# Patient Record
Sex: MALE | Race: WHITE | NOT HISPANIC OR LATINO | Employment: OTHER | URBAN - METROPOLITAN AREA
[De-identification: names, ages, dates, MRNs, and addresses within clinical notes are randomized per-mention and may not be internally consistent; named-entity substitution may affect disease eponyms.]

---

## 2020-04-02 DIAGNOSIS — J90 PLEURAL EFFUSION, NOT ELSEWHERE CLASSIFIED: Primary | ICD-10-CM

## 2020-04-20 ENCOUNTER — HOSPITAL ENCOUNTER (OUTPATIENT)
Dept: RADIOLOGY | Facility: HOSPITAL | Age: 55
Discharge: HOME | End: 2020-04-20
Attending: SURGERY
Payer: COMMERCIAL

## 2020-04-20 VITALS — BODY MASS INDEX: 27.17 KG/M2 | HEIGHT: 73 IN | WEIGHT: 205 LBS

## 2020-04-20 DIAGNOSIS — J90 PLEURAL EFFUSION, NOT ELSEWHERE CLASSIFIED: ICD-10-CM

## 2020-04-20 LAB — GLUCOSE SERPL-MCNC: 96 MG/DL (ref 70–110)

## 2020-04-20 PROCEDURE — A9552 F18 FDG: HCPCS | Mod: PO

## 2020-04-20 PROCEDURE — 78815 PET IMAGE W/CT SKULL-THIGH: CPT | Mod: TC,PO

## 2020-08-25 ENCOUNTER — TELEPHONE (OUTPATIENT)
Dept: ORTHOPEDICS | Facility: CLINIC | Age: 55
End: 2020-08-25

## 2020-08-25 NOTE — TELEPHONE ENCOUNTER
We spoke  appt booked with Dr Jaiden Yang for after pt gets his studies in mid September I also left a message with the [facility] Jaiden Price LPN that the patient was not seeing Dr Conroy  he is seeing an orthopod oncologist=Dr Jaiden Yang

## 2020-09-11 ENCOUNTER — HOSPITAL ENCOUNTER (OUTPATIENT)
Dept: RADIOLOGY | Facility: HOSPITAL | Age: 55
Discharge: HOME | End: 2020-09-11
Attending: ORTHOPAEDIC SURGERY
Payer: COMMERCIAL

## 2020-09-11 ENCOUNTER — OFFICE VISIT (OUTPATIENT)
Dept: ORTHOPEDICS | Facility: CLINIC | Age: 55
End: 2020-09-11
Payer: COMMERCIAL

## 2020-09-11 VITALS — BODY MASS INDEX: 26.51 KG/M2 | WEIGHT: 200 LBS | HEIGHT: 73 IN

## 2020-09-11 DIAGNOSIS — M25.551 PAIN OF RIGHT HIP JOINT: ICD-10-CM

## 2020-09-11 DIAGNOSIS — C79.51 METASTATIC CARCINOMA TO BONE: ICD-10-CM

## 2020-09-11 DIAGNOSIS — S72.121A: Primary | ICD-10-CM

## 2020-09-11 DIAGNOSIS — C09.9 PRIMARY TONSILLAR SQUAMOUS CELL CARCINOMA: ICD-10-CM

## 2020-09-11 DIAGNOSIS — M25.551 PAIN OF RIGHT HIP JOINT: Primary | ICD-10-CM

## 2020-09-11 PROCEDURE — 99213 OFFICE O/P EST LOW 20 MIN: CPT | Mod: PBBFAC,25 | Performed by: ORTHOPAEDIC SURGERY

## 2020-09-11 PROCEDURE — 99203 OFFICE O/P NEW LOW 30 MIN: CPT | Mod: S$PBB,,, | Performed by: ORTHOPAEDIC SURGERY

## 2020-09-11 PROCEDURE — 73502 X-RAY EXAM HIP UNI 2-3 VIEWS: CPT | Mod: TC,RT

## 2020-09-11 PROCEDURE — 73502 X-RAY EXAM HIP UNI 2-3 VIEWS: CPT | Mod: 26,RT,, | Performed by: RADIOLOGY

## 2020-09-11 PROCEDURE — 99999 PR PBB SHADOW E&M-EST. PATIENT-LVL III: CPT | Mod: PBBFAC,,, | Performed by: ORTHOPAEDIC SURGERY

## 2020-09-11 PROCEDURE — 99203 PR OFFICE/OUTPT VISIT, NEW, LEVL III, 30-44 MIN: ICD-10-PCS | Mod: S$PBB,,, | Performed by: ORTHOPAEDIC SURGERY

## 2020-09-11 PROCEDURE — 73502 XR HIP 2 VIEW RIGHT: ICD-10-PCS | Mod: 26,RT,, | Performed by: RADIOLOGY

## 2020-09-11 PROCEDURE — 99999 PR PBB SHADOW E&M-EST. PATIENT-LVL III: ICD-10-PCS | Mod: PBBFAC,,, | Performed by: ORTHOPAEDIC SURGERY

## 2020-09-11 RX ORDER — LORATADINE 10 MG/1
10 TABLET ORAL DAILY
COMMUNITY

## 2020-09-11 RX ORDER — OMEPRAZOLE 20 MG/1
20 CAPSULE, DELAYED RELEASE ORAL DAILY
COMMUNITY

## 2020-09-11 RX ORDER — CAPSAICIN 0.03 G/100G
CREAM TOPICAL 2 TIMES DAILY
COMMUNITY

## 2020-09-11 RX ORDER — IBUPROFEN 800 MG/1
800 TABLET ORAL 3 TIMES DAILY
COMMUNITY

## 2020-09-11 RX ORDER — ATORVASTATIN CALCIUM 80 MG/1
80 TABLET, FILM COATED ORAL DAILY
COMMUNITY

## 2020-09-11 RX ORDER — METHOCARBAMOL 750 MG/1
500 TABLET, FILM COATED ORAL 4 TIMES DAILY
COMMUNITY

## 2020-09-11 RX ORDER — AMITRIPTYLINE HYDROCHLORIDE 10 MG/1
10 TABLET, FILM COATED ORAL NIGHTLY PRN
COMMUNITY

## 2020-09-11 RX ORDER — FERROUS SULFATE 325(65) MG
325 TABLET ORAL
COMMUNITY

## 2020-09-11 RX ORDER — ERGOCALCIFEROL 1.25 MG/1
50000 CAPSULE ORAL
COMMUNITY

## 2020-09-11 RX ORDER — LEVOTHYROXINE SODIUM 150 UG/1
150 TABLET ORAL
COMMUNITY

## 2020-09-11 NOTE — LETTER
September 11, 2020      [facility] 5[address]  [address], [address]  [facility] [address]  Phone: [phone]       Patient: Daryl Lazo   YOB: 1965  Date of Visit: 09/11/2020    To Whom It May Concern:    Ave Lazo  was at [facility] on 09/11/2020. He may return to work on 9/14/2020 with no restrictions. He may drive.  If you have any questions or concerns, or if I can be of further assistance, please do not hesitate to contact me.    Sincerely,    Jaiden Yang MD

## 2020-09-12 PROBLEM — S72.121A: Status: ACTIVE | Noted: 2020-09-12

## 2020-09-12 PROBLEM — C09.9 PRIMARY TONSILLAR SQUAMOUS CELL CARCINOMA: Status: ACTIVE | Noted: 2020-09-12

## 2020-09-12 PROBLEM — C79.51 METASTATIC CARCINOMA TO BONE: Status: ACTIVE | Noted: 2020-09-12

## 2020-09-12 NOTE — PROGRESS NOTES
Subjective:       Patient ID: Daryl Lazo is a 55 y.o. male.    Chief Complaint:   Pain of the Right Hip   He is sent by the [facility] in [place] for pain in the right hip.  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  His only current treatment is Keytruda.  He previously had intensive chemotherapy.  He has not yet required any surgery for this bony involvement.  He was not having any pain in his right hip area when he lifted his foot up while drying off after a shower and felt a pop in his right groin.  This began a couple of months ago.  Since then he has been using a cane.  The initial pain is getting better.  He is fully weight-bearing now with minimal use of his cane.  He does not have any current weight-bearing pain in the right hip her groin area, just some residual soreness when flexing the hip.  He has chronic back pain problems, but nothing new which we might attribute to metastatic disease.    Past Medical History:   Diagnosis Date    Cancer      History reviewed. No pertinent surgical history.  Family History   Problem Relation Age of Onset    Cancer Mother     Cancer Sister      Social History     Socioeconomic History    Marital status:      Spouse name: Not on file    Number of children: Not on file    Years of education: Not on file    Highest education level: Not on file   Occupational History    Not on file   Social Needs    Financial resource strain: Not on file    Food insecurity     Worry: Not on file     Inability: Not on file    Transportation needs     Medical: Not on file     Non-medical: Not on file   Tobacco Use    Smoking status: Former Smoker    Smokeless tobacco: Never Used   Substance and Sexual Activity    Alcohol use: Yes     Comment: On occasion     Drug use: Not on file    Sexual activity: Not on file   Lifestyle    Physical activity     Days per week: Not on file     Minutes per session: Not on file    Stress:  "Not on file   Relationships    Social connections     Talks on phone: Not on file     Gets together: Not on file     Attends Mosque service: Not on file     Active member of club or organization: Not on file     Attends meetings of clubs or organizations: Not on file     Relationship status: Not on file   Other Topics Concern    Not on file   Social History Narrative    Not on file       Current Outpatient Medications   Medication Sig Dispense Refill    amitriptyline (ELAVIL) 10 MG tablet Take 10 mg by mouth nightly as needed for Insomnia.      atorvastatin (LIPITOR) 80 MG tablet Take 80 mg by mouth once daily.      capsaicin (ZOSTRIX) 0.025 % cream Apply topically 2 (two) times daily.      ergocalciferol (VITAMIN D2) 50,000 unit Cap Take 50,000 Units by mouth every 7 days.      ferrous sulfate (FEOSOL) 325 mg (65 mg iron) Tab tablet Take 325 mg by mouth daily with breakfast.      ibuprofen (ADVIL,MOTRIN) 800 MG tablet Take 800 mg by mouth 3 (three) times daily.      levothyroxine (SYNTHROID) 150 MCG tablet Take 150 mcg by mouth before breakfast.      loratadine (CLARITIN) 10 mg tablet Take 10 mg by mouth once daily.      methocarbamoL (ROBAXIN) 750 MG Tab Take 500 mg by mouth 4 (four) times daily.      omeprazole (PRILOSEC) 20 MG capsule Take 20 mg by mouth once daily.       No current facility-administered medications for this visit.      Review of patient's allergies indicates:   Allergen Reactions    Zofran [ondansetron hcl (pf)]        ROS:  A review of systems is updated and there are no reported vision changes, ear/nose/mouth/throat complaints,  chest pain, shortness of breath, abdominal pain, urological complaints, fevers or chills, psychiatric complaints. Musculoskeletal and neurologcial symptoms are as documented. All other systems are negative.    Objective:      Vitals:    09/11/20 1153   Weight: 90.7 kg (200 lb)   Height: 6' 1" (1.854 m)     Physical Exam  Mild pain with active flexion " of the hip.  No pain with passive flexion and passive internal rotation which is not limited.  No tenderness about the hip area.  Knee benign without tenderness or effusion with full range of motion.  Calves soft and nontender.  Distal neurovascular intact.  Imaging Review:   X-rays done today, AP view of the pelvis and frog leg lateral view of the right hip, show a displaced fracture of the right lesser trochanter without apparent significant subjacent bony lesion.  He did bring a CT scan of his abdomen and pelvis which confirmed the lesser trochanter fracture and also do not show a significant underlying lesion.  Assessment:   Right lesser trochanter fracture related to metastasis of carcinoma  Plan:       I explained to him that generally speaking patient is to present with lesser trochanter fracture require prophylactic stabilization.  However this is an individualized decision, and he is not currently having any weight-bearing pain.  I think this means that his risk of pathologic fracture of the proximal femur is low.  He understands that if he begins to have pain in the groin area or buttock when he puts weight on the right lower extremity, he should let me know right away.  He understands that at that point we may reimage him, and would probably recommend prophylactic fixation.    The patient's pathophysiology was explained in detail with reference to x-rays, models, other visual aids as appropriate.  Treatment options were discussed in detail.  Questions were invited and answered to the patient's satisfaction.